# Patient Record
Sex: FEMALE | Race: WHITE | Employment: FULL TIME | URBAN - METROPOLITAN AREA
[De-identification: names, ages, dates, MRNs, and addresses within clinical notes are randomized per-mention and may not be internally consistent; named-entity substitution may affect disease eponyms.]

---

## 2017-03-31 ENCOUNTER — OFFICE VISIT (OUTPATIENT)
Dept: FAMILY MEDICINE CLINIC | Facility: CLINIC | Age: 32
End: 2017-03-31

## 2017-03-31 VITALS
BODY MASS INDEX: 40.44 KG/M2 | TEMPERATURE: 98 F | HEIGHT: 60 IN | DIASTOLIC BLOOD PRESSURE: 80 MMHG | OXYGEN SATURATION: 98 % | HEART RATE: 100 BPM | SYSTOLIC BLOOD PRESSURE: 124 MMHG | RESPIRATION RATE: 14 BRPM | WEIGHT: 206 LBS

## 2017-03-31 DIAGNOSIS — S29.012A STRAIN OF MUSCLE AND TENDON OF BACK WALL OF THORAX, INITIAL ENCOUNTER: Primary | ICD-10-CM

## 2017-03-31 PROCEDURE — 99213 OFFICE O/P EST LOW 20 MIN: CPT | Performed by: NURSE PRACTITIONER

## 2017-03-31 RX ORDER — CYCLOBENZAPRINE HCL 10 MG
10 TABLET ORAL 3 TIMES DAILY
Qty: 30 TABLET | Refills: 1 | Status: SHIPPED | OUTPATIENT
Start: 2017-03-31 | End: 2017-08-04

## 2017-03-31 NOTE — PATIENT INSTRUCTIONS
· Take Naproxen twice daily for at least 2 weeks to help with inflammation  · Always take WITH FOOD to avoid stomach irritation  · Muscle relaxer up to 3 times daily if needed. Caution may cause drowsiness.   · Apply heat to the area 3 times per day  · Man without an identifiable cause. This can happen simply by stretching or moving wrong, without noting pain at the time. Other causes include:  · Overexertion, lifting, pushing, pulling incorrectly or too aggressively.   · Sudden twisting, bending or stretchin healthcare provider before using medicine, especially if you have other medical problems or are taking other medicines. · You may use over-the-counter medicine to control pain, unless another pain medicine was prescribed.  If you have chronic conditions li breathing    Deep breathing is a simple way to reduce stress. You can do it almost any time you need to relax. · Inhale slowly through your nose. Let your lungs and stomach expand. · Hold your breath for 2 to 3 seconds.   · Exhale slowly through your mout professional's instructions. Shoulder Clock Exercise    To start, stand tall with your ears, shoulders, and hips in line. Your feet should be slightly apart, positioned just under your hips. Focus your eyes directly in front of you.   this po

## 2017-03-31 NOTE — PROGRESS NOTES
CHIEF COMPLAINT:   Patient presents with:  Muscle Pain      HPI:   Flory Dunn is a 36year old female presents with complaints of pain to upper back left shoulder area. Has had for 2-3  months.   Pain is located at right upper back shoulder area /80 mmHg  Pulse 100  Temp(Src) 98.3 °F (36.8 °C) (Oral)  Resp 14  Ht 60\"  Wt 206 lb  BMI 40.23 kg/m2  SpO2 98%  LMP 03/13/2017 (Exact Date)  GENERAL: well developed, well nourished,in no apparent distress  SKIN: no rashes,no suspicious lesions  HEEN · Apply heat to the area 3 times per day  · Avoid activiites that aggravate the pain  · Start with back stretches and exercises as tolerated.                   General Neck and Back Pain    Both neck and back pain are usually caused by injury to the muscles · Overexertion, lifting, pushing, pulling incorrectly or too aggressively. · Sudden twisting, bending or stretching from an accident (car or fall), or accidental movement.   · Poor posture  · Poor conditioning, lack of regular exercise  · Spinal disc disea · You may use over-the-counter medicine to control pain, unless another pain medicine was prescribed. If you have chronic conditions like diabetes, liver or kidney disease, stomach ulcers,  gastrointestinal bleeding, or are taking blood thinner medicines. · Inhale slowly through your nose. Let your lungs and stomach expand. · Hold your breath for 2 to 3 seconds. · Exhale slowly through your mouth until your lungs feel empty. Repeat 3 to 4 times.   Relieve tension  Muscle tension can create tender spots kostas To start, stand tall with your ears, shoulders, and hips in line. Your feet should be slightly apart, positioned just under your hips. Focus your eyes directly in front of you.  this position for a few seconds before starting your exercise.  This he

## 2017-09-01 PROCEDURE — 87491 CHLMYD TRACH DNA AMP PROBE: CPT | Performed by: FAMILY MEDICINE

## 2017-09-01 PROCEDURE — 87591 N.GONORRHOEAE DNA AMP PROB: CPT | Performed by: FAMILY MEDICINE

## 2019-05-31 PROBLEM — E66.01 CLASS 3 SEVERE OBESITY WITHOUT SERIOUS COMORBIDITY WITH BODY MASS INDEX (BMI) OF 40.0 TO 44.9 IN ADULT, UNSPECIFIED OBESITY TYPE (HCC): Status: ACTIVE | Noted: 2019-05-31

## 2019-05-31 PROCEDURE — 87624 HPV HI-RISK TYP POOLED RSLT: CPT | Performed by: FAMILY MEDICINE

## 2019-05-31 PROCEDURE — 83525 ASSAY OF INSULIN: CPT | Performed by: FAMILY MEDICINE

## 2019-05-31 PROCEDURE — 88175 CYTOPATH C/V AUTO FLUID REDO: CPT | Performed by: FAMILY MEDICINE

## 2019-06-17 PROBLEM — E88.81 INSULIN RESISTANCE: Status: ACTIVE | Noted: 2019-06-17

## 2024-01-18 ENCOUNTER — V-VISIT (OUTPATIENT)
Dept: URGENT CARE | Age: 39
End: 2024-01-18

## 2024-01-18 VITALS
WEIGHT: 220 LBS | SYSTOLIC BLOOD PRESSURE: 122 MMHG | DIASTOLIC BLOOD PRESSURE: 84 MMHG | RESPIRATION RATE: 14 BRPM | BODY MASS INDEX: 36.65 KG/M2 | OXYGEN SATURATION: 99 % | TEMPERATURE: 97.6 F | HEART RATE: 85 BPM | HEIGHT: 65 IN

## 2024-01-18 DIAGNOSIS — R68.89 FLU-LIKE SYMPTOMS: ICD-10-CM

## 2024-01-18 DIAGNOSIS — H66.90 ACUTE OTITIS MEDIA, UNSPECIFIED OTITIS MEDIA TYPE: Primary | ICD-10-CM

## 2024-01-18 LAB
FLUAV AG UPPER RESP QL IA.RAPID: NEGATIVE
FLUBV AG UPPER RESP QL IA.RAPID: NEGATIVE
INTERNAL PROCEDURAL CONTROLS ACCEPTABLE: YES
S PYO AG THROAT QL IA.RAPID: NEGATIVE
SARS-COV+SARS-COV-2 AG RESP QL IA.RAPID: NOT DETECTED
TEST LOT EXPIRATION DATE: NORMAL
TEST LOT EXPIRATION DATE: NORMAL
TEST LOT NUMBER: NORMAL
TEST LOT NUMBER: NORMAL

## 2024-01-18 PROCEDURE — 87428 SARSCOV & INF VIR A&B AG IA: CPT | Performed by: NURSE PRACTITIONER

## 2024-01-18 PROCEDURE — 87880 STREP A ASSAY W/OPTIC: CPT | Performed by: NURSE PRACTITIONER

## 2024-01-18 PROCEDURE — 99203 OFFICE O/P NEW LOW 30 MIN: CPT | Performed by: NURSE PRACTITIONER

## 2024-01-18 RX ORDER — CYCLOBENZAPRINE HCL 10 MG
10 TABLET ORAL NIGHTLY PRN
COMMUNITY
Start: 2023-07-24

## 2024-01-18 RX ORDER — BUPROPION HYDROCHLORIDE 300 MG/1
300 TABLET ORAL DAILY
COMMUNITY
Start: 2024-01-06

## 2024-01-18 RX ORDER — BUPROPION HYDROCHLORIDE 150 MG/1
150 TABLET ORAL DAILY
COMMUNITY
Start: 2024-01-02

## 2024-01-18 RX ORDER — AMOXICILLIN AND CLAVULANATE POTASSIUM 875; 125 MG/1; MG/1
1 TABLET, FILM COATED ORAL EVERY 12 HOURS
Qty: 20 TABLET | Refills: 0 | Status: SHIPPED | OUTPATIENT
Start: 2024-01-18 | End: 2024-01-28

## 2024-01-18 ASSESSMENT — ENCOUNTER SYMPTOMS
ENDOCRINE NEGATIVE: 1
RHINORRHEA: 1
NAUSEA: 1
HEMATOLOGIC/LYMPHATIC NEGATIVE: 1
SINUS PRESSURE: 0
VOMITING: 0
CHILLS: 0
COUGH: 1
SORE THROAT: 1
EYES NEGATIVE: 1
PSYCHIATRIC NEGATIVE: 1
ALLERGIC/IMMUNOLOGIC NEGATIVE: 1
CONSTITUTIONAL NEGATIVE: 1
HEADACHES: 1
FEVER: 0

## (undated) NOTE — MR AVS SNAPSHOT
[id number] [facility] [address]  [place] [address]  [phone]               Thank you for choosing us for your health care visit with Marina Chow NP.   We are glad to serve you and happy to provide you with this summary of your Back and neck pain are common problems. Most people feel better in 1 or 2 weeks, and most of the rest in 1 to 2 months. Most people can remain active. People experience and describe pain differently.   · Pain can be sharp, stabbing, shooting, aching, cramp strain, it is not like the good soreness you get after exercising without an injury. In this case, stretching may make it worse. · Avoid prolonged sitting, long car rides or travel. This puts more stress on the lower back than standing or walking.   · Hellen Isaac · Loss of bowel or bladder control  When to seek medical advice  Call your healthcare provider right away if any of these occur:  · Pain becomes worse or spreads into your arms or legs  · Weakness, numbness or pain in one or both arms or legs  · Numbness i Shoulder and Upper Back Stretch  To start, stand tall with your ears, shoulders, and hips in line. Your feet should be slightly apart, positioned just under your hips. Focus your eyes directly in front of you.   this position for a few seconds befor should be slightly forward so that you’re balanced evenly on your buttocks. Relax your shoulders and keep your head level. Using a chair with arms may help you keep your balance:  · Place 1 hand on the outside elbow of the other arm.   · Pull the arm across discharge instructions in [employer] by going to Visits < Admission Summaries. If you've been to the Emergency Department or your doctor's office, you can view your past visit information in [employer] by going to Visits < Visit Summaries. [facility] questions?

## (undated) NOTE — Clinical Note
I saw Niya Bullock in the [facility]MENT OF [facility] [place] today for Strain of muscle and tendon of back wall of thorax, initial encounter  (primary encounter diagnosis). she was treated with flexiril and comfort care.  Niya Kobe will follow up with you if no better or